# Patient Record
Sex: FEMALE | Race: WHITE
[De-identification: names, ages, dates, MRNs, and addresses within clinical notes are randomized per-mention and may not be internally consistent; named-entity substitution may affect disease eponyms.]

---

## 2017-01-24 ENCOUNTER — RX RENEWAL (OUTPATIENT)
Age: 27
End: 2017-01-24

## 2017-01-24 ENCOUNTER — OTHER (OUTPATIENT)
Age: 27
End: 2017-01-24

## 2017-02-17 ENCOUNTER — RX RENEWAL (OUTPATIENT)
Age: 27
End: 2017-02-17

## 2017-04-06 ENCOUNTER — LABORATORY RESULT (OUTPATIENT)
Age: 27
End: 2017-04-06

## 2017-04-06 ENCOUNTER — APPOINTMENT (OUTPATIENT)
Dept: ENDOCRINOLOGY | Facility: CLINIC | Age: 27
End: 2017-04-06

## 2017-04-06 VITALS — OXYGEN SATURATION: 98 % | SYSTOLIC BLOOD PRESSURE: 110 MMHG | HEART RATE: 68 BPM | DIASTOLIC BLOOD PRESSURE: 70 MMHG

## 2017-04-11 ENCOUNTER — RESULT REVIEW (OUTPATIENT)
Age: 27
End: 2017-04-11

## 2017-04-11 LAB
FT4I SERPL CALC-MCNC: 7.1 INDEX
T3 SERPL-MCNC: 118 NG/DL
T3FREE SERPL-MCNC: 3.15 PG/ML
T4 FREE SERPL-MCNC: 1.1 NG/DL
TSH SERPL-ACNC: <0.01 UIU/ML

## 2017-05-04 ENCOUNTER — RX RENEWAL (OUTPATIENT)
Age: 27
End: 2017-05-04

## 2017-05-15 ENCOUNTER — RX RENEWAL (OUTPATIENT)
Age: 27
End: 2017-05-15

## 2017-05-18 ENCOUNTER — RX RENEWAL (OUTPATIENT)
Age: 27
End: 2017-05-18

## 2017-05-20 ENCOUNTER — RX RENEWAL (OUTPATIENT)
Age: 27
End: 2017-05-20

## 2017-07-25 ENCOUNTER — RX RENEWAL (OUTPATIENT)
Age: 27
End: 2017-07-25

## 2017-07-27 ENCOUNTER — RESULT REVIEW (OUTPATIENT)
Age: 27
End: 2017-07-27

## 2017-07-27 ENCOUNTER — APPOINTMENT (OUTPATIENT)
Dept: FAMILY MEDICINE | Facility: CLINIC | Age: 27
End: 2017-07-27
Payer: COMMERCIAL

## 2017-07-27 ENCOUNTER — RX RENEWAL (OUTPATIENT)
Age: 27
End: 2017-07-27

## 2017-07-27 VITALS
SYSTOLIC BLOOD PRESSURE: 102 MMHG | BODY MASS INDEX: 24.35 KG/M2 | RESPIRATION RATE: 12 BRPM | DIASTOLIC BLOOD PRESSURE: 80 MMHG | WEIGHT: 129 LBS | OXYGEN SATURATION: 98 % | HEIGHT: 61 IN | HEART RATE: 67 BPM

## 2017-07-27 PROCEDURE — 99395 PREV VISIT EST AGE 18-39: CPT | Mod: 25

## 2017-07-27 RX ORDER — LEVOTHYROXINE SODIUM 0.11 MG/1
112 TABLET ORAL
Qty: 30 | Refills: 2 | Status: DISCONTINUED | COMMUNITY
Start: 2017-05-20 | End: 2017-07-27

## 2017-07-27 RX ORDER — LEVOTHYROXINE SODIUM 0.1 MG/1
100 TABLET ORAL
Qty: 30 | Refills: 1 | Status: DISCONTINUED | COMMUNITY
Start: 2017-07-27 | End: 2017-07-27

## 2017-08-06 ENCOUNTER — EMERGENCY (EMERGENCY)
Facility: HOSPITAL | Age: 27
LOS: 1 days | Discharge: HOME | End: 2017-08-06
Attending: EMERGENCY MEDICINE | Admitting: EMERGENCY MEDICINE
Payer: COMMERCIAL

## 2017-08-06 VITALS
HEART RATE: 68 BPM | DIASTOLIC BLOOD PRESSURE: 87 MMHG | RESPIRATION RATE: 20 BRPM | OXYGEN SATURATION: 100 % | TEMPERATURE: 99 F | SYSTOLIC BLOOD PRESSURE: 129 MMHG

## 2017-08-06 PROCEDURE — 99282 EMERGENCY DEPT VISIT SF MDM: CPT

## 2017-08-06 PROCEDURE — 99284 EMERGENCY DEPT VISIT MOD MDM: CPT

## 2017-08-06 RX ORDER — LIOTHYRONINE SODIUM 25 UG/1
0 TABLET ORAL
Qty: 0 | Refills: 0 | COMMUNITY

## 2017-08-06 RX ORDER — LEVOTHYROXINE SODIUM 125 MCG
0 TABLET ORAL
Qty: 0 | Refills: 0 | COMMUNITY

## 2017-08-06 NOTE — ED PROVIDER NOTE - MEDICAL DECISION MAKING DETAILS
26 y/o female with hypothyroidism who p/w c/o needlestick. 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 She is uncertain whether the needle had blood on it. She punctured R thumb which began bleeding. She cleaned hand with copious amounts of soap/water and alcohol/purell. She states her vaccines (Hep B) are UTD. I discussed with her that there is no PEP for Hep C, and that she is already immunized for Hep B so she is unlikely to contract it. I discussed the possibility of transmitting HIV from a needlestick injury and the possibility of pt being HIV + despite a rapid HIV test that is negative, due to the window period. I discussed the risks and benefits of HIV PEP with her at this time. Pt would like to wait for the source's rapid HIV test to result, and if negative, she would like to opt to not take HIV PEP. Per patient, the source does not have any known h/o IVDA or high risk behaviors for transmitting HIV or hepatitis. 26 y/o female with hypothyroidism who p/w c/o needlestick. 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 She is uncertain whether the needle had blood on it. She punctured R thumb which began bleeding. She cleaned hand with copious amounts of soap/water and alcohol/purell. She states her vaccines (Hep B) are UTD. I discussed with her that there is no PEP for Hep C, and that she is already immunized for Hep B so she is unlikely to contract it. I discussed the possibility of transmitting HIV from a needlestick injury and the possibility of pt being HIV + despite a rapid HIV test that is negative, due to the window period. I discussed the risks and benefits of HIV PEP with her at this time. Pt would like to wait for the source's rapid HIV test to result, and if negative, she would like to opt to not take HIV PEP. Per patient, the source does not have any known h/o IVDA or high risk behaviors for transmitting HIV or hepatitis.    Anali Mancera M.D.:  Discussed HIV prophylaxis with patient - she reports that she is not interested in HIV prophylaxis at this time and will wait for the Rapid HIV test.  Employee Health Paperwork performed.  Patient is Hep B immunized.  Discussed Hep C w/ patient - no known prophylaxis available. 26 y/o female with hypothyroidism who p/w c/o needlestick. 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 She is uncertain whether the needle had blood on it. She punctured R thumb which began bleeding. She cleaned hand with copious amounts of soap/water and alcohol/purell. She states her vaccines (Hep B) are UTD. I discussed with her that there is no PEP for Hep C, and that she is already immunized for Hep B so she is unlikely to contract it. I discussed the possibility of transmitting HIV from a needlestick injury and the possibility of pt being HIV + despite a rapid HIV test that is negative, due to the window period. I discussed the risks and benefits of HIV PEP with her at this time. Pt would like to wait for the source's rapid HIV test to result, and if negative, she would like to opt to not take HIV PEP. Per patient, the source does not have any known h/o IVDA or high risk behaviors for transmitting HIV or hepatitis.    Anali Mancera M.D.:  Discussed HIV prophylaxis with patient - she reports that she is not interested in HIV prophylaxis at this time and will wait for the Rapid HIV test.  Employee health paperwork performed.  Patient is Hep B immunized.  Discussed Hep C w/ patient - no known prophylaxis available. 28 y/o female with hypothyroidism who p/w c/o needlestick. 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 She is uncertain whether the needle had blood on it. She punctured R thumb which began bleeding. She cleaned hand with copious amounts of soap/water and alcohol/purell. She states her vaccines (Hep B) are UTD. I discussed with her that there is no PEP for Hep C, and that she is already immunized for Hep B so she is unlikely to contract it. I discussed the possibility of transmitting HIV from a needlestick injury and the possibility of pt being HIV + despite a rapid HIV test that is negative, due to the window period. I discussed the risks and benefits of HIV PEP with her at this time. Pt would like to wait for the source's rapid HIV test to result, and if negative, she would like to opt to not take HIV PEP. Per patient, the source does not have any known h/o IVDA or high risk behaviors for transmitting HIV or hepatitis.    Anali Mancera M.D.:  Discussed HIV prophylaxis with patient - she reports that she is not interested in HIV prophylaxis at this time and will wait for the Rapid HIV test.  Employee health paperwork performed.  Patient is Hep B immunized.  Discussed Hep C w/ patient - no known prophylaxis available. Source's name: Moshe Castillo. MRN [id number]

## 2017-08-06 NOTE — ED PROVIDER NOTE - PROGRESS NOTE DETAILS
LAMONT Estrada MD: Rapid HIV on source pt returned negative. Discussed PEP again with pt, and she prefers to not take it. Advised  her to f/u with occupational health services tomorrow. Return precautions given. Knows to return sooner for PEP should she change her mind.

## 2017-08-06 NOTE — ED PROVIDER NOTE - PLAN OF CARE
Please follow-up with you Primary Care Physician for medical re-evaluation with the next 24-48 hours.  Please contact [employer] at [phone] within 24 hours for additional follow-up.

## 2017-08-06 NOTE — ED ADULT NURSE NOTE - OBJECTIVE STATEMENT
26 y/o female presents to ED for needlestick. 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 Pt gave yellow packet to corresponding floor. Labs sent. Awaiting call back from lab. Pt VSS.

## 2017-08-06 NOTE — ED PROVIDER NOTE - CARE PLAN
Principal Discharge DX:	Needle stick injury of finger of right hand, initial encounter  Instructions for follow-up, activity and diet:	Please follow-up with you Primary Care Physician for medical re-evaluation with the next 24-48 hours.  Please contact [employer] at [phone] within 24 hours for additional follow-up. Principal Discharge DX:	Needle stick injury of finger of right hand, initial encounter  Instructions for follow-up, activity and diet:	Please follow-up with you Primary Care Physician for medical re-evaluation with the next 24-48 hours.  Please contact [employer] at [phone] within 24 hours for additional follow-up.

## 2017-08-08 ENCOUNTER — OTHER (OUTPATIENT)
Age: 27
End: 2017-08-08

## 2017-08-10 ENCOUNTER — OTHER (OUTPATIENT)
Age: 27
End: 2017-08-10

## 2017-08-21 ENCOUNTER — RX RENEWAL (OUTPATIENT)
Age: 27
End: 2017-08-21

## 2017-08-24 ENCOUNTER — RX RENEWAL (OUTPATIENT)
Age: 27
End: 2017-08-24

## 2017-10-05 ENCOUNTER — APPOINTMENT (OUTPATIENT)
Dept: ENDOCRINOLOGY | Facility: CLINIC | Age: 27
End: 2017-10-05

## 2017-10-10 ENCOUNTER — RX RENEWAL (OUTPATIENT)
Age: 27
End: 2017-10-10

## 2017-10-13 ENCOUNTER — MOBILE ON CALL (OUTPATIENT)
Age: 27
End: 2017-10-13

## 2017-10-13 ENCOUNTER — RX RENEWAL (OUTPATIENT)
Age: 27
End: 2017-10-13

## 2017-10-17 ENCOUNTER — OTHER (OUTPATIENT)
Age: 27
End: 2017-10-17

## 2017-10-17 ENCOUNTER — MOBILE ON CALL (OUTPATIENT)
Age: 27
End: 2017-10-17

## 2017-11-10 ENCOUNTER — RX RENEWAL (OUTPATIENT)
Age: 27
End: 2017-11-10

## 2017-12-05 ENCOUNTER — RX RENEWAL (OUTPATIENT)
Age: 27
End: 2017-12-05

## 2018-01-25 ENCOUNTER — RX RENEWAL (OUTPATIENT)
Age: 28
End: 2018-01-25

## 2018-05-21 ENCOUNTER — MEDICATION RENEWAL (OUTPATIENT)
Age: 28
End: 2018-05-21

## 2018-06-01 ENCOUNTER — APPOINTMENT (OUTPATIENT)
Dept: OBGYN | Facility: CLINIC | Age: 28
End: 2018-06-01
Payer: COMMERCIAL

## 2018-06-01 PROCEDURE — 99202 OFFICE O/P NEW SF 15 MIN: CPT

## 2018-06-07 ENCOUNTER — OTHER (OUTPATIENT)
Age: 28
End: 2018-06-07

## 2018-07-09 ENCOUNTER — RX RENEWAL (OUTPATIENT)
Age: 28
End: 2018-07-09

## 2018-07-27 ENCOUNTER — MEDICATION RENEWAL (OUTPATIENT)
Age: 28
End: 2018-07-27

## 2018-07-31 ENCOUNTER — APPOINTMENT (OUTPATIENT)
Dept: FAMILY MEDICINE | Facility: CLINIC | Age: 28
End: 2018-07-31
Payer: COMMERCIAL

## 2018-07-31 VITALS
OXYGEN SATURATION: 99 % | DIASTOLIC BLOOD PRESSURE: 68 MMHG | RESPIRATION RATE: 14 BRPM | WEIGHT: 124.56 LBS | HEART RATE: 78 BPM | BODY MASS INDEX: 23.52 KG/M2 | SYSTOLIC BLOOD PRESSURE: 106 MMHG | HEIGHT: 61 IN

## 2018-07-31 PROCEDURE — 99395 PREV VISIT EST AGE 18-39: CPT

## 2018-07-31 NOTE — HISTORY OF PRESENT ILLNESS
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

## 2018-07-31 NOTE — PHYSICAL EXAM

## 2018-07-31 NOTE — HEALTH RISK ASSESSMENT
[Good] : ~his/her~  mood as  good [No falls in past year] : Patient reported no falls in the past year [0] : 1) Little interest or pleasure doing things: Not at all (0) [Patient reported PAP Smear was normal] : Patient reported PAP Smear was normal [HIV test declined] : HIV test declined [None] : None [With Significant Other] : lives with significant other [] :  [# Of Children ___] : has [unfilled] children [Fully functional (bathing, dressing, toileting, transferring, walking, feeding)] : Fully functional (bathing, dressing, toileting, transferring, walking, feeding) [Fully functional (using the telephone, shopping, preparing meals, housekeeping, doing laundry, using] : Fully functional and needs no help or supervision to perform IADLs (using the telephone, shopping, preparing meals, housekeeping, doing laundry, using transportation, managing medications and managing finances) [] : No [de-identified] : dr. rhodes [Change in mental status noted] : No change in mental status noted [Language] : denies difficulty with language [Behavior] : denies difficulty with behavior [Learning/Retaining New Information] : denies difficulty learning/retaining new information [PapSmearDate] : 07/2017

## 2018-07-31 NOTE — ASSESSMENT
[FreeTextEntry1] : will obtain routine blood work\par add celiac panel\par has follow up with gi and endocrine

## 2018-08-02 LAB
25(OH)D3 SERPL-MCNC: 36.3 NG/ML
ALBUMIN SERPL ELPH-MCNC: 4.6 G/DL
ALP BLD-CCNC: 28 U/L
ALT SERPL-CCNC: 23 U/L
ANION GAP SERPL CALC-SCNC: 14 MMOL/L
AST SERPL-CCNC: 20 U/L
BASOPHILS # BLD AUTO: 0.06 K/UL
BASOPHILS NFR BLD AUTO: 1 %
BILIRUB SERPL-MCNC: 0.3 MG/DL
BUN SERPL-MCNC: 16 MG/DL
CALCIUM SERPL-MCNC: 9.5 MG/DL
CHLORIDE SERPL-SCNC: 100 MMOL/L
CHOLEST SERPL-MCNC: 219 MG/DL
CHOLEST/HDLC SERPL: 3.9 RATIO
CO2 SERPL-SCNC: 24 MMOL/L
CREAT SERPL-MCNC: 0.63 MG/DL
ENDOMYSIUM IGA SER QL: NEGATIVE
ENDOMYSIUM IGA TITR SER: NORMAL
EOSINOPHIL # BLD AUTO: 0.08 K/UL
EOSINOPHIL NFR BLD AUTO: 1.4 %
GLIADIN IGA SER QL: <5 UNITS
GLIADIN IGG SER QL: <5 UNITS
GLIADIN PEPTIDE IGA SER-ACNC: NEGATIVE
GLIADIN PEPTIDE IGG SER-ACNC: NEGATIVE
GLUCOSE SERPL-MCNC: 92 MG/DL
HCT VFR BLD CALC: 40.4 %
HDLC SERPL-MCNC: 56 MG/DL
HGB BLD-MCNC: 13.8 G/DL
HIV1+2 AB SPEC QL IA.RAPID: NONREACTIVE
IMM GRANULOCYTES NFR BLD AUTO: 0.2 %
LDLC SERPL CALC-MCNC: 138 MG/DL
LYMPHOCYTES # BLD AUTO: 2.72 K/UL
LYMPHOCYTES NFR BLD AUTO: 47.4 %
MAN DIFF?: NORMAL
MCHC RBC-ENTMCNC: 30.7 PG
MCHC RBC-ENTMCNC: 34.2 GM/DL
MCV RBC AUTO: 90 FL
MONOCYTES # BLD AUTO: 0.37 K/UL
MONOCYTES NFR BLD AUTO: 6.4 %
NEUTROPHILS # BLD AUTO: 2.5 K/UL
NEUTROPHILS NFR BLD AUTO: 43.6 %
PLATELET # BLD AUTO: 200 K/UL
POTASSIUM SERPL-SCNC: 3.9 MMOL/L
PROT SERPL-MCNC: 6.9 G/DL
RBC # BLD: 4.49 M/UL
RBC # FLD: 12.1 %
SODIUM SERPL-SCNC: 138 MMOL/L
TRIGL SERPL-MCNC: 127 MG/DL
TSH SERPL-ACNC: 0.46 UIU/ML
TTG IGA SER IA-ACNC: <5 UNITS
TTG IGA SER-ACNC: NEGATIVE
TTG IGG SER IA-ACNC: 13.3 UNITS
TTG IGG SER IA-ACNC: NEGATIVE
VIT B12 SERPL-MCNC: 349 PG/ML
WBC # FLD AUTO: 5.74 K/UL

## 2018-08-07 ENCOUNTER — APPOINTMENT (OUTPATIENT)
Dept: GASTROENTEROLOGY | Facility: CLINIC | Age: 28
End: 2018-08-07
Payer: COMMERCIAL

## 2018-08-07 VITALS
WEIGHT: 127.13 LBS | TEMPERATURE: 97.4 F | BODY MASS INDEX: 24 KG/M2 | DIASTOLIC BLOOD PRESSURE: 60 MMHG | SYSTOLIC BLOOD PRESSURE: 104 MMHG | HEART RATE: 69 BPM | OXYGEN SATURATION: 98 % | HEIGHT: 61 IN

## 2018-08-07 PROCEDURE — 99244 OFF/OP CNSLTJ NEW/EST MOD 40: CPT

## 2018-08-08 PROBLEM — E03.9 HYPOTHYROIDISM, UNSPECIFIED: Chronic | Status: ACTIVE | Noted: 2017-08-06

## 2018-08-24 ENCOUNTER — RX RENEWAL (OUTPATIENT)
Age: 28
End: 2018-08-24

## 2018-08-28 ENCOUNTER — APPOINTMENT (OUTPATIENT)
Dept: ALLERGY | Facility: CLINIC | Age: 28
End: 2018-08-28

## 2018-10-11 ENCOUNTER — OTHER (OUTPATIENT)
Age: 28
End: 2018-10-11

## 2019-01-17 ENCOUNTER — TRANSCRIPTION ENCOUNTER (OUTPATIENT)
Age: 29
End: 2019-01-17

## 2019-03-13 ENCOUNTER — TRANSCRIPTION ENCOUNTER (OUTPATIENT)
Age: 29
End: 2019-03-13

## 2019-05-09 ENCOUNTER — APPOINTMENT (OUTPATIENT)
Dept: FAMILY MEDICINE | Facility: CLINIC | Age: 29
End: 2019-05-09
Payer: COMMERCIAL

## 2019-05-09 VITALS
HEART RATE: 72 BPM | SYSTOLIC BLOOD PRESSURE: 108 MMHG | DIASTOLIC BLOOD PRESSURE: 78 MMHG | BODY MASS INDEX: 23.79 KG/M2 | RESPIRATION RATE: 12 BRPM | OXYGEN SATURATION: 98 % | WEIGHT: 126 LBS | HEIGHT: 61 IN

## 2019-05-09 PROCEDURE — 99214 OFFICE O/P EST MOD 30 MIN: CPT

## 2019-05-10 NOTE — HISTORY OF PRESENT ILLNESS
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

## 2019-05-10 NOTE — PHYSICAL EXAM
[No Acute Distress] : no acute distress [Normal Sclera/Conjunctiva] : normal sclera/conjunctiva [PERRL] : pupils equal round and reactive to light [EOMI] : extraocular movements intact [Normal Outer Ear/Nose] : the outer ears and nose were normal in appearance [Normal Oropharynx] : the oropharynx was normal [No JVD] : no jugular venous distention [Supple] : supple [Thyroid Normal, No Nodules] : the thyroid was normal and there were no nodules present [No Lymphadenopathy] : no lymphadenopathy [No Respiratory Distress] : no respiratory distress  [Clear to Auscultation] : lungs were clear to auscultation bilaterally [No Accessory Muscle Use] : no accessory muscle use [Soft] : abdomen soft

## 2019-06-26 ENCOUNTER — LABORATORY RESULT (OUTPATIENT)
Age: 29
End: 2019-06-26

## 2019-07-01 LAB
24R-OH-CALCIDIOL SERPL-MCNC: 86 PG/ML
ALBUMIN SERPL ELPH-MCNC: 4.5 G/DL
ALP BLD-CCNC: 32 U/L
ALT SERPL-CCNC: 12 U/L
ANION GAP SERPL CALC-SCNC: 13 MMOL/L
AST SERPL-CCNC: 15 U/L
BASOPHILS # BLD AUTO: 0.04 K/UL
BASOPHILS NFR BLD AUTO: 0.9 %
BILIRUB SERPL-MCNC: 0.3 MG/DL
BUN SERPL-MCNC: 11 MG/DL
CALCIUM SERPL-MCNC: 9.6 MG/DL
CHLORIDE SERPL-SCNC: 102 MMOL/L
CHOLEST SERPL-MCNC: 185 MG/DL
CHOLEST/HDLC SERPL: 3.8 RATIO
CO2 SERPL-SCNC: 23 MMOL/L
CREAT SERPL-MCNC: 0.65 MG/DL
EOSINOPHIL # BLD AUTO: 0.05 K/UL
EOSINOPHIL NFR BLD AUTO: 1.1 %
ESTIMATED AVERAGE GLUCOSE: 100 MG/DL
GLUCOSE SERPL-MCNC: 99 MG/DL
HBA1C MFR BLD HPLC: 5.1 %
HCT VFR BLD CALC: 39.4 %
HDLC SERPL-MCNC: 49 MG/DL
HGB BLD-MCNC: 13.2 G/DL
IMM GRANULOCYTES NFR BLD AUTO: 0.2 %
LDLC SERPL CALC-MCNC: 112 MG/DL
LYMPHOCYTES # BLD AUTO: 1.49 K/UL
LYMPHOCYTES NFR BLD AUTO: 33.4 %
M TB IFN-G BLD-IMP: NEGATIVE
MAN DIFF?: NORMAL
MCHC RBC-ENTMCNC: 31.2 PG
MCHC RBC-ENTMCNC: 33.5 GM/DL
MCV RBC AUTO: 93.1 FL
MONOCYTES # BLD AUTO: 0.41 K/UL
MONOCYTES NFR BLD AUTO: 9.2 %
NEUTROPHILS # BLD AUTO: 2.46 K/UL
NEUTROPHILS NFR BLD AUTO: 55.2 %
PLATELET # BLD AUTO: 174 K/UL
POTASSIUM SERPL-SCNC: 4.5 MMOL/L
PROT SERPL-MCNC: 6.8 G/DL
QUANTIFERON TB PLUS MITOGEN MINUS NIL: >10 IU/ML
QUANTIFERON TB PLUS NIL: 0.07 IU/ML
QUANTIFERON TB PLUS TB1 MINUS NIL: 0 IU/ML
QUANTIFERON TB PLUS TB2 MINUS NIL: 0 IU/ML
RBC # BLD: 4.23 M/UL
RBC # FLD: 11.7 %
SODIUM SERPL-SCNC: 138 MMOL/L
TRIGL SERPL-MCNC: 118 MG/DL
TSH SERPL-ACNC: 0.14 UIU/ML
VIT B12 SERPL-MCNC: 544 PG/ML
WBC # FLD AUTO: 4.46 K/UL

## 2019-12-02 ENCOUNTER — APPOINTMENT (OUTPATIENT)
Dept: FAMILY MEDICINE | Facility: CLINIC | Age: 29
End: 2019-12-02
Payer: COMMERCIAL

## 2019-12-02 VITALS
RESPIRATION RATE: 14 BRPM | WEIGHT: 124.44 LBS | SYSTOLIC BLOOD PRESSURE: 102 MMHG | OXYGEN SATURATION: 100 % | BODY MASS INDEX: 23.49 KG/M2 | DIASTOLIC BLOOD PRESSURE: 70 MMHG | HEART RATE: 1 BPM | HEIGHT: 61 IN

## 2019-12-02 PROCEDURE — 99213 OFFICE O/P EST LOW 20 MIN: CPT

## 2019-12-04 NOTE — HISTORY OF PRESENT ILLNESS
[FreeTextEntry8] : Left breast itchiness for one month. 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  Denies any bumps, or lumps of her breast \par \par Itchiness mostly only on the nipple.\par \par She has not applied any cream\par \par Denies chronic skin conditions. NO history of eczema.\par \par Notes grandmother with ovarian cancer. MOther with questionably positive BRCA gene.\par \par Patient is due for repeat TFTS

## 2019-12-05 ENCOUNTER — APPOINTMENT (OUTPATIENT)
Dept: SURGERY | Facility: CLINIC | Age: 29
End: 2019-12-05
Payer: COMMERCIAL

## 2019-12-05 VITALS
TEMPERATURE: 100 F | OXYGEN SATURATION: 98 % | HEART RATE: 79 BPM | BODY MASS INDEX: 23.6 KG/M2 | RESPIRATION RATE: 16 BRPM | DIASTOLIC BLOOD PRESSURE: 73 MMHG | WEIGHT: 125 LBS | SYSTOLIC BLOOD PRESSURE: 112 MMHG | HEIGHT: 61 IN

## 2019-12-05 DIAGNOSIS — Z82.49 FAMILY HISTORY OF ISCHEMIC HEART DISEASE AND OTHER DISEASES OF THE CIRCULATORY SYSTEM: ICD-10-CM

## 2019-12-05 PROCEDURE — 99204 OFFICE O/P NEW MOD 45 MIN: CPT

## 2019-12-05 RX ORDER — HYDROCORTISONE 25 MG/G
2.5 CREAM TOPICAL
Qty: 1 | Refills: 1 | Status: DISCONTINUED | COMMUNITY
Start: 2019-12-02 | End: 2019-12-05

## 2019-12-05 NOTE — CONSULT LETTER
[Dear  ___] : Dear  [unfilled], [Consult Letter:] : I had the pleasure of evaluating your patient, [unfilled]. [Please see my note below.] : Please see my note below. [Consult Closing:] : Thank you very much for allowing me to participate in the care of this patient.  If you have any questions, please do not hesitate to contact me. [Sincerely,] : Sincerely, [FreeTextEntry3] : I have reviewed all the documentation for this encounter with the patient and have edited where appropriate\par \par Dr. Edison Arthur

## 2019-12-05 NOTE — HISTORY OF PRESENT ILLNESS
[de-identified] : Palak is a 28 y/o female here for a consultation, right nipple rash. She noticed it 3-4 week ago. Denies pain, discharge. Patient states that her mother is practical positive. Patient states that she has felt no lumps or masses in her breast.

## 2019-12-05 NOTE — PHYSICAL EXAM
[Normal Thyroid] : the thyroid was normal [Normal Breath Sounds] : Normal breath sounds [Normal Heart Sounds] : normal heart sounds [No Rash or Lesion] : No rash or lesion [Calm] : calm [Alert] : alert [Oriented to Person] : oriented to person [Oriented to Place] : oriented to place [Oriented to Time] : oriented to time [JVD] : no jugular venous distention  [de-identified] : WNL [de-identified] : WNL [Carotid Bruits] : no carotid bruits [de-identified] : Examination of both breasts in the supine sitting position reveals some thickening of the subareolar skin at the 6:00 position of the left breast and the configuration of the left nipple shows some change in the 12 to 3:00 position.  There is no nipple discharge noted at this time. [de-identified] : Full ROM

## 2019-12-05 NOTE — ASSESSMENT
[FreeTextEntry1] : I have taken a smear of the left nipple and sent it for cytology though I suspect it will be negative. I sent the patient for a mammogram and a sonogram.\par \par The patient will call me after she has those imaging studies done. If all is negative I would suggest a biopsy of the left nipple to be absolutely sure we are not dealing with Paget's.

## 2019-12-09 LAB — OTHER FLUID CYTOLOGY: NORMAL

## 2020-01-10 ENCOUNTER — APPOINTMENT (OUTPATIENT)
Dept: DERMATOLOGY | Facility: CLINIC | Age: 30
End: 2020-01-10

## 2020-01-22 ENCOUNTER — APPOINTMENT (OUTPATIENT)
Dept: MAMMOGRAPHY | Facility: HOSPITAL | Age: 30
End: 2020-01-22
Payer: COMMERCIAL

## 2020-01-22 ENCOUNTER — APPOINTMENT (OUTPATIENT)
Dept: ULTRASOUND IMAGING | Facility: HOSPITAL | Age: 30
End: 2020-01-22
Payer: COMMERCIAL

## 2020-01-22 ENCOUNTER — OUTPATIENT (OUTPATIENT)
Dept: OUTPATIENT SERVICES | Facility: HOSPITAL | Age: 30
LOS: 1 days | End: 2020-01-22
Payer: COMMERCIAL

## 2020-01-22 DIAGNOSIS — N61.0 MASTITIS WITHOUT ABSCESS: ICD-10-CM

## 2020-01-22 DIAGNOSIS — L29.9 PRURITUS, UNSPECIFIED: ICD-10-CM

## 2020-01-22 PROCEDURE — 76641 ULTRASOUND BREAST COMPLETE: CPT | Mod: 26,50

## 2020-01-22 PROCEDURE — G0279: CPT | Mod: 26

## 2020-01-22 PROCEDURE — 76641 ULTRASOUND BREAST COMPLETE: CPT

## 2020-01-22 PROCEDURE — 77066 DX MAMMO INCL CAD BI: CPT

## 2020-01-22 PROCEDURE — G0279: CPT

## 2020-01-22 PROCEDURE — 77066 DX MAMMO INCL CAD BI: CPT | Mod: 26

## 2020-04-14 ENCOUNTER — RX RENEWAL (OUTPATIENT)
Age: 30
End: 2020-04-14

## 2020-04-26 ENCOUNTER — MESSAGE (OUTPATIENT)
Age: 30
End: 2020-04-26

## 2020-05-02 ENCOUNTER — APPOINTMENT (OUTPATIENT)
Dept: DISASTER EMERGENCY | Facility: HOSPITAL | Age: 30
End: 2020-05-02

## 2020-05-04 LAB
SARS-COV-2 IGG SERPL IA-ACNC: 0.3 RATIO
SARS-COV-2 IGG SERPL QL IA: NEGATIVE

## 2020-05-15 ENCOUNTER — APPOINTMENT (OUTPATIENT)
Dept: FAMILY MEDICINE | Facility: CLINIC | Age: 30
End: 2020-05-15
Payer: COMMERCIAL

## 2020-05-15 PROCEDURE — 99213 OFFICE O/P EST LOW 20 MIN: CPT | Mod: 95

## 2020-05-18 NOTE — PHYSICAL EXAM
[No Respiratory Distress] : no respiratory distress  [Normal] : no acute distress, well nourished, well developed and well-appearing [Normal Affect] : the affect was normal [Normal Insight/Judgement] : insight and judgment were intact

## 2020-06-15 NOTE — ASSESSMENT
[FreeTextEntry1] : refill thyroid medication\par will obtain labs this summer\ludy pegueron will also need pap this summer, please follow up for cpe in next few months

## 2020-06-15 NOTE — HISTORY OF PRESENT ILLNESS
[Other Location: e.g. School (Enter Location, City,State)___] : at [unfilled], at the time of the visit. [Other Location: e.g. 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

## 2020-07-06 ENCOUNTER — RX RENEWAL (OUTPATIENT)
Age: 30
End: 2020-07-06

## 2020-11-30 ENCOUNTER — APPOINTMENT (OUTPATIENT)
Dept: FAMILY MEDICINE | Facility: CLINIC | Age: 30
End: 2020-11-30
Payer: COMMERCIAL

## 2020-11-30 VITALS
BODY MASS INDEX: 24.22 KG/M2 | HEART RATE: 71 BPM | HEIGHT: 61 IN | SYSTOLIC BLOOD PRESSURE: 112 MMHG | RESPIRATION RATE: 12 BRPM | DIASTOLIC BLOOD PRESSURE: 72 MMHG | OXYGEN SATURATION: 98 % | TEMPERATURE: 99.1 F | WEIGHT: 128.31 LBS

## 2020-11-30 DIAGNOSIS — Z12.39 ENCOUNTER FOR OTHER SCREENING FOR MALIGNANT NEOPLASM OF BREAST: ICD-10-CM

## 2020-11-30 PROCEDURE — 99395 PREV VISIT EST AGE 18-39: CPT

## 2020-11-30 NOTE — ASSESSMENT
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

## 2020-11-30 NOTE — HISTORY OF PRESENT ILLNESS
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

## 2020-12-01 ENCOUNTER — LABORATORY RESULT (OUTPATIENT)
Age: 30
End: 2020-12-01

## 2020-12-03 LAB
CHOLEST SERPL-MCNC: 203 MG/DL
HDLC SERPL-MCNC: 67 MG/DL
LDLC SERPL CALC-MCNC: 111 MG/DL
NONHDLC SERPL-MCNC: 136 MG/DL
TRIGL SERPL-MCNC: 126 MG/DL

## 2020-12-08 ENCOUNTER — TRANSCRIPTION ENCOUNTER (OUTPATIENT)
Age: 30
End: 2020-12-08

## 2020-12-28 ENCOUNTER — TRANSCRIPTION ENCOUNTER (OUTPATIENT)
Age: 30
End: 2020-12-28

## 2021-04-26 ENCOUNTER — RX RENEWAL (OUTPATIENT)
Age: 31
End: 2021-04-26

## 2021-04-27 ENCOUNTER — TRANSCRIPTION ENCOUNTER (OUTPATIENT)
Age: 31
End: 2021-04-27

## 2021-06-08 ENCOUNTER — RX RENEWAL (OUTPATIENT)
Age: 31
End: 2021-06-08

## 2021-07-05 ENCOUNTER — RX RENEWAL (OUTPATIENT)
Age: 31
End: 2021-07-05

## 2021-07-06 ENCOUNTER — APPOINTMENT (OUTPATIENT)
Dept: FAMILY MEDICINE | Facility: CLINIC | Age: 31
End: 2021-07-06

## 2021-07-08 ENCOUNTER — APPOINTMENT (OUTPATIENT)
Dept: FAMILY MEDICINE | Facility: CLINIC | Age: 31
End: 2021-07-08
Payer: COMMERCIAL

## 2021-07-08 VITALS
HEIGHT: 61 IN | RESPIRATION RATE: 14 BRPM | DIASTOLIC BLOOD PRESSURE: 62 MMHG | WEIGHT: 117.13 LBS | OXYGEN SATURATION: 98 % | HEART RATE: 75 BPM | SYSTOLIC BLOOD PRESSURE: 122 MMHG | BODY MASS INDEX: 22.11 KG/M2 | TEMPERATURE: 97.8 F

## 2021-07-08 DIAGNOSIS — Z12.4 ENCOUNTER FOR SCREENING FOR MALIGNANT NEOPLASM OF CERVIX: ICD-10-CM

## 2021-07-08 PROCEDURE — 99072 ADDL SUPL MATRL&STAF TM PHE: CPT

## 2021-07-08 NOTE — PLAN
[FreeTextEntry1] : well woman exam wnl, wait for pap/hpv results and f/u results to patient. Advised patient to call with any questions or concerns. Patient verbalized understanding. \par \par tsh blood test updated, patient will go to check levels

## 2021-07-08 NOTE — PHYSICAL EXAM
[Normal Sclera/Conjunctiva] : normal sclera/conjunctiva [External Female Genitalia] : normal external genitalia [Vagina] : normal vaginal exam [Cervix] : normal cervix [Uterus] : uterus was normal size, without masses or tenderness [Uterine Adnexae] : normal adnexa [Normal Gait] : normal gait [Normal] : affect was normal and insight and judgment were intact

## 2021-07-08 NOTE — HISTORY OF PRESENT ILLNESS
[FreeTextEntry1] : pap [de-identified] : Patient here today for pap, she declines any std screenings or breast exam. She is taking OCPs LMP 6/17/21. She reports never having any abonormal paps in the past. She denies any issues (urinary frequency, urgency, burning, fevers, chills, abd pain, n/v, vaginal discharge, itching, pain, painful intercourse, lesions/lumps/sores, or bleeding). No other health concerns today.

## 2021-07-13 LAB
CYTOLOGY CVX/VAG DOC THIN PREP: NORMAL
HPV HIGH+LOW RISK DNA PNL CVX: NOT DETECTED

## 2021-08-13 ENCOUNTER — RX RENEWAL (OUTPATIENT)
Age: 31
End: 2021-08-13

## 2021-09-28 ENCOUNTER — RX RENEWAL (OUTPATIENT)
Age: 31
End: 2021-09-28

## 2021-10-12 ENCOUNTER — APPOINTMENT (OUTPATIENT)
Dept: FAMILY MEDICINE | Facility: CLINIC | Age: 31
End: 2021-10-12

## 2021-10-20 ENCOUNTER — NON-APPOINTMENT (OUTPATIENT)
Age: 31
End: 2021-10-20

## 2021-10-21 ENCOUNTER — APPOINTMENT (OUTPATIENT)
Dept: OBGYN | Facility: CLINIC | Age: 31
End: 2021-10-21
Payer: COMMERCIAL

## 2021-10-21 ENCOUNTER — NON-APPOINTMENT (OUTPATIENT)
Age: 31
End: 2021-10-21

## 2021-10-21 VITALS
BODY MASS INDEX: 21.9 KG/M2 | HEIGHT: 61 IN | WEIGHT: 116 LBS | OXYGEN SATURATION: 98 % | DIASTOLIC BLOOD PRESSURE: 72 MMHG | TEMPERATURE: 98.1 F | HEART RATE: 73 BPM | SYSTOLIC BLOOD PRESSURE: 112 MMHG

## 2021-10-21 DIAGNOSIS — R10.2 PELVIC AND PERINEAL PAIN: ICD-10-CM

## 2021-10-21 PROCEDURE — 99214 OFFICE O/P EST MOD 30 MIN: CPT

## 2021-10-21 RX ORDER — DROSPIRENONE AND ETHINYL ESTRADIOL 0.02-3(28)
3-0.02 KIT ORAL
Refills: 0 | Status: DISCONTINUED | COMMUNITY
End: 2021-10-21

## 2021-10-21 NOTE — HISTORY OF PRESENT ILLNESS
[Patient reported PAP Smear was normal] : Patient reported PAP Smear was normal [FreeTextEntry1] : 30 yo P1 with LMP -10/20 presents today for eval for AUB. Patient recently started OCP Vestura and for the month of September had spotting. Denies symptoms of anemia. Was on gianvi and loved it but was discontinued. 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 Spotting has stopped as of today, declines exam. Also co occasional alternate sided pelvic twinges before menses\par \par \par POB:  x1--son is 10yo\par PGYN: PMDD, never pelvic infections, nl paps\par PMH: hypothyroid\par PSH: denies\par Med: per MAR\par All: NKMA\par SH: denies\par  [PapSmeardate] : 2021

## 2021-10-21 NOTE — PLAN
[FreeTextEntry1] : To start Mita, same dose of drospirenone/estro as prev ocp\par Will continue work up if AUB continues\par TVUS ordered as needed if pelvic pain continues\par \par I spent the time noted on the day of this patient encounter preparing for, providing and documenting the above E/M service and counseling and educate patient on differential, workup, disease course, and treatment/management. Education was provided to the patient during this encounter. All questions and concerns were answered and addressed in detail.\par \par Belem Arvizu MD\par

## 2021-10-21 NOTE — COUNSELING
[Contraception/ Emergency Contraception/ Safe Sexual Practices] : contraception, emergency contraception, safe sexual practices [Confidentiality] : confidentiality [STD (testing, results, tx)] : STD (testing, results, tx) [Medication Management] : medication management

## 2021-10-22 ENCOUNTER — TRANSCRIPTION ENCOUNTER (OUTPATIENT)
Age: 31
End: 2021-10-22

## 2021-10-22 LAB
HCG UR QL: NEGATIVE
QUALITY CONTROL: YES

## 2021-10-22 RX ORDER — DROSPIRENONE AND ETHINYL ESTRADIOL 0.02-3(28)
3-0.02 KIT ORAL DAILY
Qty: 3 | Refills: 3 | Status: DISCONTINUED | COMMUNITY
Start: 2021-10-21 | End: 2021-10-22

## 2021-10-22 RX ORDER — DROSPIRENONE AND ETHINYL ESTRADIOL 0.02-3(28)
3-0.02 KIT ORAL
Qty: 3 | Refills: 1 | Status: DISCONTINUED | COMMUNITY
Start: 2021-08-13 | End: 2021-10-22

## 2021-10-29 ENCOUNTER — TRANSCRIPTION ENCOUNTER (OUTPATIENT)
Age: 31
End: 2021-10-29

## 2021-10-29 ENCOUNTER — RX RENEWAL (OUTPATIENT)
Age: 31
End: 2021-10-29

## 2021-11-01 ENCOUNTER — RX RENEWAL (OUTPATIENT)
Age: 31
End: 2021-11-01

## 2021-11-02 ENCOUNTER — RX RENEWAL (OUTPATIENT)
Age: 31
End: 2021-11-02

## 2021-11-19 ENCOUNTER — TRANSCRIPTION ENCOUNTER (OUTPATIENT)
Age: 31
End: 2021-11-19

## 2021-11-28 ENCOUNTER — RX RENEWAL (OUTPATIENT)
Age: 31
End: 2021-11-28

## 2021-11-29 ENCOUNTER — RX RENEWAL (OUTPATIENT)
Age: 31
End: 2021-11-29

## 2021-12-01 ENCOUNTER — APPOINTMENT (OUTPATIENT)
Dept: FAMILY MEDICINE | Facility: CLINIC | Age: 31
End: 2021-12-01
Payer: COMMERCIAL

## 2021-12-01 VITALS
HEIGHT: 61 IN | TEMPERATURE: 98.4 F | OXYGEN SATURATION: 99 % | HEART RATE: 96 BPM | DIASTOLIC BLOOD PRESSURE: 64 MMHG | BODY MASS INDEX: 22.51 KG/M2 | SYSTOLIC BLOOD PRESSURE: 102 MMHG | RESPIRATION RATE: 12 BRPM | WEIGHT: 119.25 LBS

## 2021-12-01 PROCEDURE — 99395 PREV VISIT EST AGE 18-39: CPT

## 2021-12-08 ENCOUNTER — LABORATORY RESULT (OUTPATIENT)
Age: 31
End: 2021-12-08

## 2021-12-09 LAB
25(OH)D3 SERPL-MCNC: 26.3 NG/ML
BASOPHILS # BLD AUTO: 0.07 K/UL
BASOPHILS NFR BLD AUTO: 1.1 %
CHOLEST SERPL-MCNC: 236 MG/DL
EOSINOPHIL # BLD AUTO: 0.16 K/UL
EOSINOPHIL NFR BLD AUTO: 2.5 %
ESTIMATED AVERAGE GLUCOSE: 103 MG/DL
HBA1C MFR BLD HPLC: 5.2 %
HCT VFR BLD CALC: 39.8 %
HDLC SERPL-MCNC: 56 MG/DL
HGB BLD-MCNC: 13.4 G/DL
IMM GRANULOCYTES NFR BLD AUTO: 0 %
LDLC SERPL CALC-MCNC: 130 MG/DL
LYMPHOCYTES # BLD AUTO: 2.68 K/UL
LYMPHOCYTES NFR BLD AUTO: 42.1 %
MAN DIFF?: NORMAL
MCHC RBC-ENTMCNC: 30.9 PG
MCHC RBC-ENTMCNC: 33.7 GM/DL
MCV RBC AUTO: 91.9 FL
MONOCYTES # BLD AUTO: 0.41 K/UL
MONOCYTES NFR BLD AUTO: 6.4 %
NEUTROPHILS # BLD AUTO: 3.04 K/UL
NEUTROPHILS NFR BLD AUTO: 47.9 %
NONHDLC SERPL-MCNC: 180 MG/DL
PLATELET # BLD AUTO: 208 K/UL
RBC # BLD: 4.33 M/UL
RBC # FLD: 11.4 %
T3 SERPL-MCNC: 97 NG/DL
T4 SERPL-MCNC: 7.7 UG/DL
TRIGL SERPL-MCNC: 249 MG/DL
TSH SERPL-ACNC: 0.72 UIU/ML
WBC # FLD AUTO: 6.36 K/UL

## 2022-01-07 RX ORDER — DROSPIRENONE AND ETHINYL ESTRADIOL 0.02-3(28)
3-0.02 KIT ORAL DAILY
Qty: 3 | Refills: 3 | Status: DISCONTINUED | COMMUNITY
Start: 2021-10-29 | End: 2022-01-07

## 2022-01-07 RX ORDER — DROSPIRENONE AND ETHINYL ESTRADIOL 0.02-3(28)
3-0.02 KIT ORAL DAILY
Qty: 3 | Refills: 3 | Status: DISCONTINUED | COMMUNITY
Start: 2021-10-22 | End: 2022-01-07

## 2022-01-31 ENCOUNTER — TRANSCRIPTION ENCOUNTER (OUTPATIENT)
Age: 32
End: 2022-01-31

## 2022-02-01 ENCOUNTER — APPOINTMENT (OUTPATIENT)
Dept: ORTHOPEDIC SURGERY | Facility: CLINIC | Age: 32
End: 2022-02-01
Payer: COMMERCIAL

## 2022-02-01 PROCEDURE — 29075 APPL CST ELBW FNGR SHORT ARM: CPT | Mod: RT

## 2022-02-01 PROCEDURE — 99204 OFFICE O/P NEW MOD 45 MIN: CPT | Mod: 25

## 2022-02-01 NOTE — DISCUSSION/SUMMARY
[FreeTextEntry1] : 31-year-old female status post mechanical fall while skiing this past weekend, x-rays reveal nondisplaced distal radius fracture of the right wrist.\par Risks and benefits of close treatment versus surgical intervention were discussed.  Due to the nondisplaced nature of her fracture the patient's been recommended for close treatment with cast immobilization.  Patient was placed into a short arm cast today and tolerated the procedure well.  She will remain nonweightbearing on the right upper extremity as well as avoid lifting.  She will follow up in 2 weeks for repeat x-rays and cast removal.

## 2022-02-01 NOTE — HISTORY OF PRESENT ILLNESS
[FreeTextEntry1] : 31 year female presents for evaluation of right wrist injury s/p mechanical fall while skiiing this weekend. She fell on an outstretched wrist. She was evaluated in the ED where xrays were taken revealing a nondisplaced distal radius fx. She was placed into an extension splint and recommended follow up here today. She reports her pain is mild at rest. She denies regular use of medication.

## 2022-02-01 NOTE — PHYSICAL EXAM
[de-identified] : right wrist: \par skin intact, mild swelling, no ecchymosis, no gross deformity. \par ROM of the wrist reduced 2/2 pain and swelling. \par Positive tenderness palpation to the distal radius, nontender ulnar styloid, nontender snuffbox\par Range of motion of the digits intact.\par sensation intact distally, cap refill < 2 sec  [de-identified] : 3 x-ray views of the right wrist taken at the emergency room yesterday reviewed in detail reveal a nondisplaced distal radius fracture.

## 2022-02-02 ENCOUNTER — RX RENEWAL (OUTPATIENT)
Age: 32
End: 2022-02-02

## 2022-02-15 ENCOUNTER — APPOINTMENT (OUTPATIENT)
Dept: ORTHOPEDIC SURGERY | Facility: CLINIC | Age: 32
End: 2022-02-15
Payer: COMMERCIAL

## 2022-02-15 PROCEDURE — 73110 X-RAY EXAM OF WRIST: CPT | Mod: RT

## 2022-02-15 PROCEDURE — 99213 OFFICE O/P EST LOW 20 MIN: CPT

## 2022-02-15 NOTE — PHYSICAL EXAM
[de-identified] : right wrist: \par cast removed. skin intact, mild swelling, no ecchymosis, no gross deformity. \par ROM of the wrist reduced 2/2 pain and swelling. \par Positive tenderness palpation to the distal radius, nontender ulnar styloid, nontender snuffbox\par Range of motion of the digits intact.\par sensation intact distally, cap refill < 2 sec  [de-identified] : The following radiographs were ordered and read by me during this patients visit. I reviewed each radiograph in detail with the patient and discussed the findings as highlighted below.\par 3 x-ray views of the right wrist reviewed in detail reveal a nondisplaced distal radius fracture, no further displacement.

## 2022-02-15 NOTE — DISCUSSION/SUMMARY
[FreeTextEntry1] : 31-year-old female status post mechanical fall while skiing this past weekend, x-rays reveal nondisplaced distal radius fracture of the right wrist.\par the patient's been recommended for continued closed treatment with immobilization.  Patient was transitioned to a wrist immobilizer today and tolerated the procedure well.  She will remain nonweightbearing on the right upper extremity as well as avoid lifting.  She will follow up in 2 weeks for repeat x-rays.

## 2022-02-15 NOTE — HISTORY OF PRESENT ILLNESS
[FreeTextEntry1] : 31 year female presents for evaluation of right wrist injury s/p mechanical fall while skiiing this weekend. She fell on an outstretched wrist. She was evaluated in the ED where xrays were taken revealing a nondisplaced distal radius fx. She was placed into an extension splint and recommended follow up here today. She reports her pain is mild at rest. She denies regular use of medication. \par \par 02/15/2022: Patient returns 2 weeks s/p closed treatment of a nondisplaced distal radius fx. She reports mild improvement in her symptoms. She has kept the cast clean dry and intact.

## 2022-02-19 ENCOUNTER — RX RENEWAL (OUTPATIENT)
Age: 32
End: 2022-02-19

## 2022-03-02 ENCOUNTER — APPOINTMENT (OUTPATIENT)
Dept: ORTHOPEDIC SURGERY | Facility: CLINIC | Age: 32
End: 2022-03-02

## 2022-06-08 ENCOUNTER — LABORATORY RESULT (OUTPATIENT)
Age: 32
End: 2022-06-08

## 2022-07-06 ENCOUNTER — APPOINTMENT (OUTPATIENT)
Dept: ORTHOPEDIC SURGERY | Facility: CLINIC | Age: 32
End: 2022-07-06

## 2022-08-10 ENCOUNTER — APPOINTMENT (OUTPATIENT)
Dept: OBGYN | Facility: CLINIC | Age: 32
End: 2022-08-10

## 2023-01-01 NOTE — PHYSICAL EXAM
[No Masses] : no palpable masses [No Axillary Lymphadenopathy] : no axillary lymphadenopathy [No Nipple Discharge] : no nipple discharge [Normal] : affect was normal and insight and judgment were intact [de-identified] : NOtes erythematous rash of nipple and distal aspect of breast. MIld excoriation noted.  Adequate: hears normal conversation without difficulty

## 2023-03-06 ENCOUNTER — APPOINTMENT (OUTPATIENT)
Dept: FAMILY MEDICINE | Facility: CLINIC | Age: 33
End: 2023-03-06
Payer: COMMERCIAL

## 2023-03-06 ENCOUNTER — APPOINTMENT (OUTPATIENT)
Dept: FAMILY MEDICINE | Facility: CLINIC | Age: 33
End: 2023-03-06

## 2023-03-06 VITALS
WEIGHT: 130 LBS | OXYGEN SATURATION: 98 % | HEART RATE: 65 BPM | SYSTOLIC BLOOD PRESSURE: 111 MMHG | HEIGHT: 62 IN | RESPIRATION RATE: 12 BRPM | BODY MASS INDEX: 23.92 KG/M2 | TEMPERATURE: 98.7 F | DIASTOLIC BLOOD PRESSURE: 70 MMHG

## 2023-03-06 DIAGNOSIS — E55.9 VITAMIN D DEFICIENCY, UNSPECIFIED: ICD-10-CM

## 2023-03-06 DIAGNOSIS — Z13.1 ENCOUNTER FOR SCREENING FOR DIABETES MELLITUS: ICD-10-CM

## 2023-03-06 DIAGNOSIS — Z13.220 ENCOUNTER FOR SCREENING FOR LIPOID DISORDERS: ICD-10-CM

## 2023-03-06 DIAGNOSIS — Z80.41 FAMILY HISTORY OF MALIGNANT NEOPLASM OF OVARY: ICD-10-CM

## 2023-03-06 PROCEDURE — 99395 PREV VISIT EST AGE 18-39: CPT

## 2023-03-06 RX ORDER — DROSPIRENONE AND ETHINYL ESTRADIOL 0.03MG-3MG
3-0.03 KIT ORAL
Qty: 3 | Refills: 3 | Status: COMPLETED | COMMUNITY
Start: 2022-01-07 | End: 2023-03-06

## 2023-03-06 NOTE — HEALTH RISK ASSESSMENT
[Yes] : Yes [2 - 3 times a week (3 pts)] : 2 - 3  times a week (3 points) [1 or 2 (0 pts)] : 1 or 2 (0 points) [Never (0 pts)] : Never (0 points) [0] : 2) Feeling down, depressed, or hopeless: Not at all (0) [PHQ-2 Negative - No further assessment needed] : PHQ-2 Negative - No further assessment needed [Hepatitis C test declined] : Hepatitis C test declined [With Family] : lives with family [Employed] : employed [] :  [Fully functional (bathing, dressing, toileting, transferring, walking, feeding)] : Fully functional (bathing, dressing, toileting, transferring, walking, feeding) [Fully functional (using the telephone, shopping, preparing meals, housekeeping, doing laundry, using] : Fully functional and needs no help or supervision to perform IADLs (using the telephone, shopping, preparing meals, housekeeping, doing laundry, using transportation, managing medications and managing finances) [Audit-CScore] : 3 [[id number]] : 0 [HIVDate] : 08/18 [de-identified] :  and son   [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

## 2023-03-06 NOTE — PLAN
[FreeTextEntry1] : #HCM \par -lab work script provided \par -vaccinations: \par     COVID: received 3 vaccines \par     Influenza : up to date \par     TDAP: up to date \par -depression screen negative \par -follow w/ GYN, last pap smear 2021, has apt tomorrow for annual \par -follow-up yearly for CPE and PRN \par

## 2023-03-06 NOTE — HISTORY OF PRESENT ILLNESS
[FreeTextEntry1] : CPE  [de-identified] : 31 yo F PMH Hypothyroidism, vitamin D deficiency presenting today for CPE.  \par She follows w/ GYN ( Dr. Arvizu),  Endo (Dr. Haines) . \par With endo in January her levothyroxine was decreased from 100 to 88 mcg. \par \par \par

## 2023-03-06 NOTE — PHYSICAL EXAM
[Normal Outer Ear/Nose] : the outer ears and nose were normal in appearance [Normal Oropharynx] : the oropharynx was normal [Normal Appearance] : normal in appearance [No Masses] : no palpable masses [No Axillary Lymphadenopathy] : no axillary lymphadenopathy [Coordination Grossly Intact] : coordination grossly intact [No Focal Deficits] : no focal deficits [Normal] : affect was normal and insight and judgment were intact

## 2023-03-07 ENCOUNTER — APPOINTMENT (OUTPATIENT)
Dept: OBGYN | Facility: CLINIC | Age: 33
End: 2023-03-07
Payer: COMMERCIAL

## 2023-03-07 VITALS
OXYGEN SATURATION: 96 % | SYSTOLIC BLOOD PRESSURE: 114 MMHG | BODY MASS INDEX: 23.92 KG/M2 | TEMPERATURE: 98 F | HEIGHT: 62 IN | HEART RATE: 72 BPM | DIASTOLIC BLOOD PRESSURE: 80 MMHG | WEIGHT: 130 LBS

## 2023-03-07 DIAGNOSIS — N76.0 ACUTE VAGINITIS: ICD-10-CM

## 2023-03-07 LAB
HCG UR QL: NEGATIVE
QUALITY CONTROL: YES

## 2023-03-07 PROCEDURE — 81025 URINE PREGNANCY TEST: CPT

## 2023-03-07 PROCEDURE — 99395 PREV VISIT EST AGE 18-39: CPT

## 2023-03-07 NOTE — COUNSELING
[Breast Self Exam] : breast self exam [Contraception/ Emergency Contraception/ Safe Sexual Practices] : contraception, emergency contraception, safe sexual practices [Confidentiality] : confidentiality [STD (testing, results, tx)] : STD (testing, results, tx)

## 2023-03-07 NOTE — HISTORY OF PRESENT ILLNESS
[FreeTextEntry1] : Pt is a 32 year old presents today for well woman exam. History of PMDD--tried multiple BC in the past, have not interacted with her body positively--would like to wait to try something new. Separately, has been complaining for pelvic pain intermittently--like a pulling feeling. Rec TVUS, patient to return to office first week of April\par \par LMP: 2023\par POB:  x 1\par PGYN: PMDD, denies pelvic infection, NL paps\par PMH: hypothyroid \par PSH: denies\par Med: per MAR\par All: NKDA\par SH: denies\par FH:\par Mammo: 2020\par

## 2023-03-07 NOTE — PLAN
[FreeTextEntry1] : \par \par I spent the time noted on the day of this patient encounter preparing for, providing and documenting the above service. I have  counseled and educated the patient on the differential, workup, disease course, and treatment/management plan. Education was provided to the patient during this encounter. All questions and concerns were answered and addressed in detail.\par \par Belem Arvizu MD\par

## 2023-03-08 LAB
25(OH)D3 SERPL-MCNC: 26.1 NG/ML
ALBUMIN SERPL ELPH-MCNC: 4.5 G/DL
ALP BLD-CCNC: 31 U/L
ALT SERPL-CCNC: 12 U/L
ANION GAP SERPL CALC-SCNC: 12 MMOL/L
AST SERPL-CCNC: 16 U/L
BASOPHILS # BLD AUTO: 0.06 K/UL
BASOPHILS NFR BLD AUTO: 1 %
BILIRUB SERPL-MCNC: 0.4 MG/DL
BUN SERPL-MCNC: 15 MG/DL
CALCIUM SERPL-MCNC: 9.3 MG/DL
CHLORIDE SERPL-SCNC: 103 MMOL/L
CHOLEST SERPL-MCNC: 160 MG/DL
CO2 SERPL-SCNC: 25 MMOL/L
CREAT SERPL-MCNC: 0.58 MG/DL
EGFR: 122 ML/MIN/1.73M2
EOSINOPHIL # BLD AUTO: 0.15 K/UL
EOSINOPHIL NFR BLD AUTO: 2.5 %
ESTIMATED AVERAGE GLUCOSE: 103 MG/DL
GLUCOSE SERPL-MCNC: 90 MG/DL
HBA1C MFR BLD HPLC: 5.2 %
HCT VFR BLD CALC: 39.7 %
HDLC SERPL-MCNC: 60 MG/DL
HGB BLD-MCNC: 13.4 G/DL
HPV HIGH+LOW RISK DNA PNL CVX: NOT DETECTED
IMM GRANULOCYTES NFR BLD AUTO: 0.2 %
LDLC SERPL CALC-MCNC: 83 MG/DL
LYMPHOCYTES # BLD AUTO: 2.59 K/UL
LYMPHOCYTES NFR BLD AUTO: 43.2 %
MAN DIFF?: NORMAL
MCHC RBC-ENTMCNC: 31.6 PG
MCHC RBC-ENTMCNC: 33.8 GM/DL
MCV RBC AUTO: 93.6 FL
MONOCYTES # BLD AUTO: 0.4 K/UL
MONOCYTES NFR BLD AUTO: 6.7 %
NEUTROPHILS # BLD AUTO: 2.78 K/UL
NEUTROPHILS NFR BLD AUTO: 46.4 %
NONHDLC SERPL-MCNC: 100 MG/DL
PLATELET # BLD AUTO: 186 K/UL
POTASSIUM SERPL-SCNC: 4.6 MMOL/L
PROT SERPL-MCNC: 6.5 G/DL
RBC # BLD: 4.24 M/UL
RBC # FLD: 12.5 %
SODIUM SERPL-SCNC: 140 MMOL/L
T3 SERPL-MCNC: 92 NG/DL
T4 FREE SERPL-MCNC: 1 NG/DL
TRIGL SERPL-MCNC: 86 MG/DL
TSH SERPL-ACNC: 0.39 UIU/ML
WBC # FLD AUTO: 5.99 K/UL

## 2023-03-09 ENCOUNTER — TRANSCRIPTION ENCOUNTER (OUTPATIENT)
Age: 33
End: 2023-03-09

## 2023-03-09 LAB
C TRACH RRNA SPEC QL NAA+PROBE: NOT DETECTED
CANDIDA VAG CYTO: NOT DETECTED
G VAGINALIS+PREV SP MTYP VAG QL MICRO: NOT DETECTED
N GONORRHOEA RRNA SPEC QL NAA+PROBE: NOT DETECTED
SOURCE AMPLIFICATION: NORMAL
T VAGINALIS VAG QL WET PREP: NOT DETECTED

## 2023-03-13 ENCOUNTER — NON-APPOINTMENT (OUTPATIENT)
Age: 33
End: 2023-03-13

## 2023-03-13 LAB — CYTOLOGY CVX/VAG DOC THIN PREP: NORMAL

## 2023-03-14 ENCOUNTER — TRANSCRIPTION ENCOUNTER (OUTPATIENT)
Age: 33
End: 2023-03-14

## 2023-04-04 ENCOUNTER — APPOINTMENT (OUTPATIENT)
Dept: OBGYN | Facility: CLINIC | Age: 33
End: 2023-04-04
Payer: COMMERCIAL

## 2023-04-04 VITALS
SYSTOLIC BLOOD PRESSURE: 110 MMHG | HEIGHT: 62 IN | HEART RATE: 81 BPM | OXYGEN SATURATION: 98 % | WEIGHT: 120 LBS | DIASTOLIC BLOOD PRESSURE: 70 MMHG | TEMPERATURE: 97.6 F | BODY MASS INDEX: 22.08 KG/M2

## 2023-04-04 PROCEDURE — 99213 OFFICE O/P EST LOW 20 MIN: CPT | Mod: 25

## 2023-04-04 PROCEDURE — 76856 US EXAM PELVIC COMPLETE: CPT

## 2023-04-04 NOTE — PHYSICAL EXAM
[Chaperone Present] : A chaperone was present in the examining room during all aspects of the physical examination [Appropriately responsive] : appropriately responsive [Alert] : alert [No Acute Distress] : no acute distress [No Lymphadenopathy] : no lymphadenopathy [Oriented x3] : oriented x3 [Labia Majora] : normal [Labia Minora] : normal DISPLAY PLAN FREE TEXT [Normal] : normal

## 2023-04-04 NOTE — PROCEDURE
[Pelvic Pain] : pelvic pain [Transvaginal Ultrasound] : transvaginal ultrasound [Anteverted] : anteverted [L: ___ cm] : L: [unfilled] cm [H: ___ cm] : H: [unfilled] cm [FreeTextEntry7] : 2.78x1.91 [FreeTextEntry8] : 3.27x2.22; paraovarian cyst 1.0x1.2

## 2024-01-22 DIAGNOSIS — Z13.1 ENCOUNTER FOR SCREENING FOR DIABETES MELLITUS: ICD-10-CM

## 2024-02-05 ENCOUNTER — TRANSCRIPTION ENCOUNTER (OUTPATIENT)
Age: 34
End: 2024-02-05

## 2024-02-29 LAB
25(OH)D3 SERPL-MCNC: 35.6 NG/ML
ALBUMIN SERPL ELPH-MCNC: 4.7 G/DL
ALP BLD-CCNC: 33 U/L
ALT SERPL-CCNC: 18 U/L
ANION GAP SERPL CALC-SCNC: 12 MMOL/L
AST SERPL-CCNC: 21 U/L
BASOPHILS # BLD AUTO: 0.06 K/UL
BASOPHILS NFR BLD AUTO: 1 %
BILIRUB SERPL-MCNC: 0.5 MG/DL
BUN SERPL-MCNC: 13 MG/DL
CALCIUM SERPL-MCNC: 9.6 MG/DL
CHLORIDE SERPL-SCNC: 102 MMOL/L
CO2 SERPL-SCNC: 26 MMOL/L
CREAT SERPL-MCNC: 0.55 MG/DL
EGFR: 123 ML/MIN/1.73M2
EOSINOPHIL # BLD AUTO: 0.11 K/UL
EOSINOPHIL NFR BLD AUTO: 1.8 %
ESTIMATED AVERAGE GLUCOSE: 100 MG/DL
GLUCOSE SERPL-MCNC: 83 MG/DL
HBA1C MFR BLD HPLC: 5.1 %
HCT VFR BLD CALC: 41.9 %
HGB BLD-MCNC: 14.1 G/DL
IMM GRANULOCYTES NFR BLD AUTO: 0.2 %
LYMPHOCYTES # BLD AUTO: 2.19 K/UL
LYMPHOCYTES NFR BLD AUTO: 35.7 %
MAN DIFF?: NORMAL
MCHC RBC-ENTMCNC: 31.3 PG
MCHC RBC-ENTMCNC: 33.7 GM/DL
MCV RBC AUTO: 92.9 FL
MONOCYTES # BLD AUTO: 0.42 K/UL
MONOCYTES NFR BLD AUTO: 6.8 %
NEUTROPHILS # BLD AUTO: 3.35 K/UL
NEUTROPHILS NFR BLD AUTO: 54.5 %
PLATELET # BLD AUTO: 197 K/UL
POTASSIUM SERPL-SCNC: 4.8 MMOL/L
PROT SERPL-MCNC: 7 G/DL
RBC # BLD: 4.51 M/UL
RBC # FLD: 12.5 %
SODIUM SERPL-SCNC: 139 MMOL/L
WBC # FLD AUTO: 6.14 K/UL

## 2024-03-12 ENCOUNTER — APPOINTMENT (OUTPATIENT)
Dept: FAMILY MEDICINE | Facility: CLINIC | Age: 34
End: 2024-03-12
Payer: COMMERCIAL

## 2024-03-12 VITALS
HEART RATE: 74 BPM | HEIGHT: 62 IN | WEIGHT: 130 LBS | DIASTOLIC BLOOD PRESSURE: 80 MMHG | SYSTOLIC BLOOD PRESSURE: 112 MMHG | BODY MASS INDEX: 23.92 KG/M2 | RESPIRATION RATE: 16 BRPM | TEMPERATURE: 97.9 F | OXYGEN SATURATION: 100 %

## 2024-03-12 DIAGNOSIS — Z30.09 ENCOUNTER FOR OTHER GENERAL COUNSELING AND ADVICE ON CONTRACEPTION: ICD-10-CM

## 2024-03-12 DIAGNOSIS — Z87.898 PERSONAL HISTORY OF OTHER SPECIFIED CONDITIONS: ICD-10-CM

## 2024-03-12 DIAGNOSIS — Z00.00 ENCOUNTER FOR GENERAL ADULT MEDICAL EXAMINATION W/OUT ABNORMAL FINDINGS: ICD-10-CM

## 2024-03-12 DIAGNOSIS — Z87.42 PERSONAL HISTORY OF OTHER DISEASES OF THE FEMALE GENITAL TRACT: ICD-10-CM

## 2024-03-12 DIAGNOSIS — K59.09 OTHER CONSTIPATION: ICD-10-CM

## 2024-03-12 DIAGNOSIS — N64.52 NIPPLE DISCHARGE: ICD-10-CM

## 2024-03-12 DIAGNOSIS — S52.551A OTHER EXTRAARTICULAR FRACTURE OF LOWER END OF RIGHT RADIUS, INITIAL ENCOUNTER FOR CLOSED FRACTURE: ICD-10-CM

## 2024-03-12 DIAGNOSIS — E03.9 HYPOTHYROIDISM, UNSPECIFIED: ICD-10-CM

## 2024-03-12 DIAGNOSIS — Z13.220 ENCOUNTER FOR SCREENING FOR LIPOID DISORDERS: ICD-10-CM

## 2024-03-12 DIAGNOSIS — N61.0 MASTITIS WITHOUT ABSCESS: ICD-10-CM

## 2024-03-12 DIAGNOSIS — Z86.39 PERSONAL HISTORY OF OTHER ENDOCRINE, NUTRITIONAL AND METABOLIC DISEASE: ICD-10-CM

## 2024-03-12 LAB
CHOLEST SERPL-MCNC: 183 MG/DL
HDLC SERPL-MCNC: 59 MG/DL
LDLC SERPL CALC-MCNC: 114 MG/DL
NONHDLC SERPL-MCNC: 124 MG/DL
T4 FREE SERPL-MCNC: 1.3 NG/DL
TRIGL SERPL-MCNC: 54 MG/DL
TSH SERPL-ACNC: 0.06 UIU/ML

## 2024-03-12 PROCEDURE — 96127 BRIEF EMOTIONAL/BEHAV ASSMT: CPT

## 2024-03-12 PROCEDURE — 99395 PREV VISIT EST AGE 18-39: CPT

## 2024-03-12 RX ORDER — LEVOTHYROXINE SODIUM 0.09 MG/1
88 TABLET ORAL
Qty: 90 | Refills: 1 | Status: DISCONTINUED | COMMUNITY
Start: 2023-03-06 | End: 2024-03-12

## 2024-03-12 RX ORDER — DROSPIRENONE AND ETHINYL ESTRADIOL 0.02-3(28)
3-0.02 KIT ORAL
Qty: 3 | Refills: 3 | Status: DISCONTINUED | COMMUNITY
Start: 2023-03-09 | End: 2024-03-12

## 2024-03-12 RX ORDER — LEVOTHYROXINE SODIUM 0.1 MG/1
100 TABLET ORAL
Refills: 0 | Status: ACTIVE | COMMUNITY

## 2024-03-12 NOTE — HEALTH RISK ASSESSMENT
[Patient reported PAP Smear was normal] : Patient reported PAP Smear was normal [Yes] : Yes [2 - 3 times a week (3 pts)] : 2 - 3  times a week (3 points) [1 or 2 (0 pts)] : 1 or 2 (0 points) [Never (0 pts)] : Never (0 points) [No] : In the past 12 months have you used drugs other than those required for medical reasons? No [0] : 2) Feeling down, depressed, or hopeless: Not at all (0) [PHQ-2 Negative - No further assessment needed] : PHQ-2 Negative - No further assessment needed [HIV test declined] : HIV test declined [Hepatitis C test declined] : Hepatitis C test declined [Never] : Never [Audit-CScore] : 3 [de-identified] : Boxing classes [de-identified] : Gets enough fruits/vegetables [[id number]] : 0 [PapSmearDate] : 03/23

## 2024-03-12 NOTE — REVIEW OF SYSTEMS
[Constipation] : constipation [Abdominal Pain] : no abdominal pain [Nausea] : no nausea [Vomiting] : no vomiting [Negative] : Heme/Lymph

## 2024-03-12 NOTE — ASSESSMENT
[FreeTextEntry1] : # HM Reviewed AV labs Up to date w/ HPV vaccine Due for Pap smear, she will see Dr. Arvizu  # Hypothyroid Cont levothyroxine 100 and liothyronine for now Cont following w/ endo  # Constipation Maintain adequate fiber and water in diet  # LDL elevated Decrease red meat/fatty food intake  f/u in 1 year or PRN

## 2024-03-12 NOTE — HISTORY OF PRESENT ILLNESS
[FreeTextEntry1] : CPE [de-identified] :  Pt comes in for CPE. Pt is trying to become pregnant. Has been trying for about 6 months, is considering going to reproductive specialist.  Hypothyroid: pt has been on levothyroxine for years. Following with endocrinologist with [facility]. Also taking liothyronine 5 BID.  Constipation: takes magnesium and miralax PRN. Tries to keep adequate fiber and water intake in diet.

## 2024-03-12 NOTE — PHYSICAL EXAM
[Normal TMs] : both tympanic membranes were normal [No Lymphadenopathy] : no lymphadenopathy [Supple] : supple [Normal] : normal rate, regular rhythm, normal S1 and S2 and no murmur heard [No Edema] : there was no peripheral edema [No Extremity Clubbing/Cyanosis] : no extremity clubbing/cyanosis [Soft] : abdomen soft [Non Tender] : non-tender [Non-distended] : non-distended [No Masses] : no abdominal mass palpated [Normal Supraclavicular Nodes] : no supraclavicular lymphadenopathy [Normal Anterior Cervical Nodes] : no anterior cervical lymphadenopathy [Normal Gait] : normal gait [Normal Affect] : the affect was normal [Alert and Oriented x3] : oriented to person, place, and time [Normal Insight/Judgement] : insight and judgment were intact

## 2024-03-21 ENCOUNTER — APPOINTMENT (OUTPATIENT)
Dept: OBGYN | Facility: CLINIC | Age: 34
End: 2024-03-21
Payer: COMMERCIAL

## 2024-03-21 VITALS
DIASTOLIC BLOOD PRESSURE: 72 MMHG | BODY MASS INDEX: 22.08 KG/M2 | OXYGEN SATURATION: 98 % | HEART RATE: 72 BPM | TEMPERATURE: 97.9 F | HEIGHT: 62 IN | WEIGHT: 120 LBS | SYSTOLIC BLOOD PRESSURE: 112 MMHG

## 2024-03-21 DIAGNOSIS — Z01.419 ENCOUNTER FOR GYNECOLOGICAL EXAMINATION (GENERAL) (ROUTINE) W/OUT ABNORMAL FINDINGS: ICD-10-CM

## 2024-03-21 LAB
HCG UR QL: NEGATIVE
QUALITY CONTROL: YES

## 2024-03-21 PROCEDURE — 99395 PREV VISIT EST AGE 18-39: CPT

## 2024-03-25 LAB
C TRACH RRNA SPEC QL NAA+PROBE: NOT DETECTED
CANDIDA VAG CYTO: NOT DETECTED
G VAGINALIS+PREV SP MTYP VAG QL MICRO: NOT DETECTED
HPV HIGH+LOW RISK DNA PNL CVX: NOT DETECTED
N GONORRHOEA RRNA SPEC QL NAA+PROBE: NOT DETECTED
SOURCE AMPLIFICATION: NORMAL
T VAGINALIS VAG QL WET PREP: NOT DETECTED

## 2024-03-28 LAB — CYTOLOGY CVX/VAG DOC THIN PREP: NORMAL
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
## 2024-07-15 ENCOUNTER — TRANSCRIPTION ENCOUNTER (OUTPATIENT)
Age: 34
End: 2024-07-15

## 2024-08-01 ENCOUNTER — TRANSCRIPTION ENCOUNTER (OUTPATIENT)
Age: 34
End: 2024-08-01

## 2025-01-22 ENCOUNTER — APPOINTMENT (OUTPATIENT)
Dept: FAMILY MEDICINE | Facility: CLINIC | Age: 35
End: 2025-01-22
Payer: COMMERCIAL

## 2025-01-22 VITALS
HEIGHT: 62 IN | SYSTOLIC BLOOD PRESSURE: 110 MMHG | TEMPERATURE: 97.3 F | DIASTOLIC BLOOD PRESSURE: 80 MMHG | WEIGHT: 132.31 LBS | RESPIRATION RATE: 14 BRPM | HEART RATE: 61 BPM | BODY MASS INDEX: 24.35 KG/M2 | OXYGEN SATURATION: 99 %

## 2025-01-22 DIAGNOSIS — R09.89 OTHER SPECIFIED SYMPTOMS AND SIGNS INVOLVING THE CIRCULATORY AND RESPIRATORY SYSTEMS: ICD-10-CM

## 2025-01-22 PROCEDURE — G2211 COMPLEX E/M VISIT ADD ON: CPT | Mod: NC

## 2025-01-22 PROCEDURE — 99213 OFFICE O/P EST LOW 20 MIN: CPT
